# Patient Record
Sex: FEMALE | Race: WHITE | NOT HISPANIC OR LATINO | Employment: OTHER | URBAN - METROPOLITAN AREA
[De-identification: names, ages, dates, MRNs, and addresses within clinical notes are randomized per-mention and may not be internally consistent; named-entity substitution may affect disease eponyms.]

---

## 2017-01-11 DIAGNOSIS — F41.9 ANXIETY: ICD-10-CM

## 2017-01-11 DIAGNOSIS — E78.5 DYSLIPIDEMIA: ICD-10-CM

## 2017-01-11 DIAGNOSIS — R53.1 GENERALIZED WEAKNESS: ICD-10-CM

## 2017-01-11 DIAGNOSIS — R31.29 HEMATURIA, MICROSCOPIC: ICD-10-CM

## 2017-01-11 DIAGNOSIS — E55.9 VITAMIN D DEFICIENCY: ICD-10-CM

## 2017-01-11 DIAGNOSIS — R79.89 ELEVATED TSH: ICD-10-CM

## 2017-01-11 DIAGNOSIS — E86.0 DEHYDRATION: ICD-10-CM

## 2017-01-11 DIAGNOSIS — I10 BENIGN ESSENTIAL HYPERTENSION: Primary | ICD-10-CM

## 2017-01-11 DIAGNOSIS — R73.9 HYPERGLYCEMIA: ICD-10-CM

## 2017-01-12 ENCOUNTER — OFFICE VISIT (OUTPATIENT)
Dept: INTERNAL MEDICINE | Facility: CLINIC | Age: 72
End: 2017-01-12

## 2017-01-12 VITALS
BODY MASS INDEX: 25.52 KG/M2 | HEIGHT: 60 IN | WEIGHT: 130 LBS | DIASTOLIC BLOOD PRESSURE: 72 MMHG | HEART RATE: 80 BPM | TEMPERATURE: 97.6 F | OXYGEN SATURATION: 92 % | SYSTOLIC BLOOD PRESSURE: 130 MMHG

## 2017-01-12 DIAGNOSIS — E55.9 VITAMIN D DEFICIENCY: ICD-10-CM

## 2017-01-12 DIAGNOSIS — F32.89 OTHER DEPRESSION: ICD-10-CM

## 2017-01-12 DIAGNOSIS — E86.0 DEHYDRATION: ICD-10-CM

## 2017-01-12 DIAGNOSIS — I10 BENIGN ESSENTIAL HYPERTENSION: Primary | ICD-10-CM

## 2017-01-12 DIAGNOSIS — K58.8 OTHER IRRITABLE BOWEL SYNDROME: ICD-10-CM

## 2017-01-12 DIAGNOSIS — R73.9 HYPERGLYCEMIA: ICD-10-CM

## 2017-01-12 DIAGNOSIS — F41.9 ANXIETY: ICD-10-CM

## 2017-01-12 DIAGNOSIS — Z09 HOSPITAL DISCHARGE FOLLOW-UP: ICD-10-CM

## 2017-01-12 PROBLEM — K58.9 IBS (IRRITABLE BOWEL SYNDROME): Status: ACTIVE | Noted: 2017-01-12

## 2017-01-12 LAB
25(OH)D3+25(OH)D2 SERPL-MCNC: 15.4 NG/ML (ref 30–100)
ALBUMIN SERPL-MCNC: 4.3 G/DL (ref 3.5–5.2)
ALBUMIN/GLOB SERPL: 1.7 G/DL
ALP SERPL-CCNC: 66 U/L (ref 39–117)
ALT SERPL-CCNC: 16 U/L (ref 1–33)
APPEARANCE UR: CLEAR
AST SERPL-CCNC: 17 U/L (ref 1–32)
BACTERIA #/AREA URNS HPF: ABNORMAL /HPF
BASOPHILS # BLD AUTO: 0.01 10*3/MM3 (ref 0–0.2)
BASOPHILS NFR BLD AUTO: 0.3 % (ref 0–1.5)
BILIRUB SERPL-MCNC: 0.3 MG/DL (ref 0.1–1.2)
BILIRUB UR QL STRIP: (no result)
BUN SERPL-MCNC: 25 MG/DL (ref 8–23)
BUN/CREAT SERPL: 31.3 (ref 7–25)
CALCIUM SERPL-MCNC: 10.2 MG/DL (ref 8.6–10.5)
CASTS URNS MICRO: ABNORMAL
CHLORIDE SERPL-SCNC: 106 MMOL/L (ref 98–107)
CHOLEST SERPL-MCNC: 206 MG/DL (ref 0–200)
CO2 SERPL-SCNC: 26.7 MMOL/L (ref 22–29)
COLOR UR: YELLOW
CREAT SERPL-MCNC: 0.8 MG/DL (ref 0.57–1)
DIFFERENTIAL COMMENT: NORMAL
EOSINOPHIL # BLD AUTO: 0.05 10*3/MM3 (ref 0–0.7)
EOSINOPHIL NFR BLD AUTO: 1.4 % (ref 0.3–6.2)
EPI CELLS #/AREA URNS HPF: ABNORMAL /HPF
ERYTHROCYTE [DISTWIDTH] IN BLOOD BY AUTOMATED COUNT: 14.4 % (ref 11.7–13)
GLOBULIN SER CALC-MCNC: 2.6 GM/DL
GLUCOSE SERPL-MCNC: 122 MG/DL (ref 65–99)
GLUCOSE UR QL: (no result)
HBA1C MFR BLD: 5 % (ref 4.8–5.6)
HCT VFR BLD AUTO: 31.7 % (ref 35.6–45.5)
HDLC SERPL-MCNC: 70 MG/DL (ref 40–60)
HGB BLD-MCNC: 10.4 G/DL (ref 11.9–15.5)
HGB UR QL STRIP: (no result)
IMM GRANULOCYTES # BLD: 0 10*3/MM3 (ref 0–0.03)
IMM GRANULOCYTES NFR BLD: 0 % (ref 0–0.5)
KETONES UR QL STRIP: (no result)
LDLC SERPL CALC-MCNC: 117 MG/DL (ref 0–100)
LEUKOCYTE ESTERASE UR QL STRIP: (no result)
LYMPHOCYTES # BLD AUTO: 1.15 10*3/MM3 (ref 0.9–4.8)
LYMPHOCYTES NFR BLD AUTO: 32.4 % (ref 19.6–45.3)
MCH RBC QN AUTO: 40.2 PG (ref 26.9–32)
MCHC RBC AUTO-ENTMCNC: 32.8 G/DL (ref 32.4–36.3)
MCV RBC AUTO: 122.4 FL (ref 80.5–98.2)
MONOCYTES # BLD AUTO: 0.17 10*3/MM3 (ref 0.2–1.2)
MONOCYTES NFR BLD AUTO: 4.8 % (ref 5–12)
NEUTROPHILS # BLD AUTO: 2.17 10*3/MM3 (ref 1.9–8.1)
NEUTROPHILS NFR BLD AUTO: 61.1 % (ref 42.7–76)
NITRITE UR QL STRIP: (no result)
PH UR STRIP: 5.5 [PH] (ref 5–8)
PLATELET # BLD AUTO: 104 10*3/MM3 (ref 140–500)
PLATELET BLD QL SMEAR: NORMAL
POTASSIUM SERPL-SCNC: 5.1 MMOL/L (ref 3.5–5.2)
PROT SERPL-MCNC: 6.9 G/DL (ref 6–8.5)
PROT UR QL STRIP: (no result)
RBC # BLD AUTO: 2.59 10*6/MM3 (ref 3.9–5.2)
RBC #/AREA URNS HPF: ABNORMAL /HPF
RBC MORPH BLD: NORMAL
SODIUM SERPL-SCNC: 145 MMOL/L (ref 136–145)
SP GR UR: 1.02 (ref 1–1.03)
T4 FREE SERPL-MCNC: 1.05 NG/DL (ref 0.93–1.7)
TRIGL SERPL-MCNC: 95 MG/DL (ref 0–150)
TSH SERPL DL<=0.005 MIU/L-ACNC: 1.89 MIU/ML (ref 0.27–4.2)
UROBILINOGEN UR STRIP-MCNC: (no result) MG/DL
VLDLC SERPL CALC-MCNC: 19 MG/DL (ref 5–40)
WBC # BLD AUTO: 3.55 10*3/MM3 (ref 4.5–10.7)
WBC #/AREA URNS HPF: ABNORMAL /HPF

## 2017-01-12 PROCEDURE — 99214 OFFICE O/P EST MOD 30 MIN: CPT | Performed by: INTERNAL MEDICINE

## 2017-01-12 RX ORDER — DICYCLOMINE HYDROCHLORIDE 10 MG/1
10 CAPSULE ORAL
Qty: 120 CAPSULE | Refills: 1 | Status: SHIPPED | OUTPATIENT
Start: 2017-01-12 | End: 2017-03-22

## 2017-01-19 DIAGNOSIS — R79.89 LOW VITAMIN D LEVEL: Primary | ICD-10-CM

## 2017-01-21 NOTE — PROGRESS NOTES
Subjective   Kyra Pratt is a 71 y.o. female.   She is here today for six-month follow-up for hypertension irritable bowel syndrome dehydration hospital discharge follow-up anxiety vitamin D deficiency hyper glycemia and depression  History of Present Illness   She is here today for six-month follow-up for hypertension irritable bowel syndrome and she is here for hospital discharge follow-up for dehydration as well along with anxiety vitamin D deficiency hyper glycemia and depression and she has no new complaints today except needs a refill on her dicyclomine  The following portions of the patient's history were reviewed and updated as appropriate: allergies, current medications, past family history, past medical history, past social history, past surgical history and problem list.    Review of Systems   All other systems reviewed and are negative.      Objective   Physical Exam   Constitutional: She is oriented to person, place, and time. She appears well-developed and well-nourished. She is cooperative.   HENT:   Head: Normocephalic and atraumatic.   Right Ear: Tympanic membrane and external ear normal.   Left Ear: Tympanic membrane and external ear normal.   Nose: Nose normal.   Mouth/Throat: Oropharynx is clear and moist.   Eyes: Conjunctivae, EOM and lids are normal. Pupils are equal, round, and reactive to light.   Neck: Phonation normal. Neck supple. Carotid bruit is not present.   Cardiovascular: Normal rate, regular rhythm and normal heart sounds.  Exam reveals no gallop and no friction rub.    No murmur heard.  Pulmonary/Chest: Effort normal and breath sounds normal. No respiratory distress.   Abdominal: Soft. Bowel sounds are normal. She exhibits no distension and no mass. There is no hepatosplenomegaly. There is no tenderness. There is no rebound and no guarding. No hernia.   Musculoskeletal: She exhibits no edema.   Neurological: She is alert and oriented to person, place, and time. Coordination and  gait normal.   Skin: Skin is warm and dry.   Psychiatric: She has a normal mood and affect. Her speech is normal and behavior is normal. Judgment and thought content normal.   Nursing note and vitals reviewed.      Assessment/Plan   Diagnoses and all orders for this visit:    Benign essential hypertension    Other irritable bowel syndrome    Dehydration    Hospital discharge follow-up    Anxiety    Vitamin D deficiency    Hyperglycemia    Other depression    Other orders  -     dicyclomine (BENTYL) 10 MG capsule; Take 1 capsule by mouth 4 (Four) Times a Day Before Meals & at Bedtime.      Hypertension well-controlled on current medication  Irritable bowel syndrome continue dicyclomine  Dehydration better now after hospital follow-up  Anxiety supportive meds for this  Vitamin D deficiency supplementation as needed  Hyperglycemia follow hemoglobin A1 C as needed  Depression stable on current medication

## 2017-01-23 ENCOUNTER — APPOINTMENT (OUTPATIENT)
Dept: WOMENS IMAGING | Facility: HOSPITAL | Age: 72
End: 2017-01-23

## 2017-01-23 PROCEDURE — 77063 BREAST TOMOSYNTHESIS BI: CPT | Performed by: RADIOLOGY

## 2017-01-23 PROCEDURE — G0202 SCR MAMMO BI INCL CAD: HCPCS | Performed by: RADIOLOGY

## 2017-01-23 PROCEDURE — MDREVIEWSP: Performed by: RADIOLOGY

## 2017-03-22 ENCOUNTER — OFFICE VISIT (OUTPATIENT)
Dept: GASTROENTEROLOGY | Facility: CLINIC | Age: 72
End: 2017-03-22

## 2017-03-22 VITALS
HEIGHT: 60 IN | WEIGHT: 136.8 LBS | SYSTOLIC BLOOD PRESSURE: 130 MMHG | DIASTOLIC BLOOD PRESSURE: 80 MMHG | BODY MASS INDEX: 26.86 KG/M2

## 2017-03-22 DIAGNOSIS — R19.7 DIARRHEA, UNSPECIFIED TYPE: Primary | ICD-10-CM

## 2017-03-22 DIAGNOSIS — C76.2 ABDOMINAL CARCINOMATOSIS (HCC): ICD-10-CM

## 2017-03-22 DIAGNOSIS — R10.30 LOWER ABDOMINAL PAIN: ICD-10-CM

## 2017-03-22 PROCEDURE — 99203 OFFICE O/P NEW LOW 30 MIN: CPT | Performed by: INTERNAL MEDICINE

## 2017-03-22 RX ORDER — FOLIC ACID 1 MG/1
TABLET ORAL
Refills: 3 | COMMUNITY
Start: 2017-02-10

## 2017-03-22 RX ORDER — HYOSCYAMINE SULFATE 0.125 MG
0.12 TABLET ORAL
Qty: 90 TABLET | Refills: 3 | Status: SHIPPED | OUTPATIENT
Start: 2017-03-22

## 2017-03-22 RX ORDER — APIXABAN 5 MG/1
TABLET, FILM COATED ORAL
Refills: 0 | COMMUNITY
Start: 2017-03-17

## 2017-03-22 NOTE — PROGRESS NOTES
Chief Complaint   Patient presents with   • Abdominal Pain     currently having chemo   • Pre-op Exam     may need a colonoscopy     Kyra Pratt is a 71 y.o. female who presents with crampy abdominal pain.  She has a history of endometrial carcinoma. She has regular BM and then looser stools - hard to get clean.  No blood in stool.  No prior colonoscopy.  No fh crc/polyps. She has tried dicyclomine 10 mg - she takes 2 four times daily without improvement.  No blood in stool. She had recent CT with carcinomatosis with left hydronephrosis - she has plans to start a new chemo regimen next weeks x 4 months.  Colon was noted to be normal with the exception of diverticulosis on CT.  No n/v.    HPI  Past Medical History:   Diagnosis Date   • Benign essential hypertension    • Cancer     uterine   • H/O dilation and curettage    • Vitamin D deficiency      Past Surgical History:   Procedure Laterality Date   • DILATATION AND CURETTAGE     • HYSTERECTOMY         Current Outpatient Prescriptions:   •  ELIQUIS 5 MG tablet tablet, TAKE 2 TABLETS BY MOUTH TWICE A DAY FOR 7 DAYS, THEN 1 TABLET 2 TIMES DAILY, Disp: , Rfl: 0  •  folic acid (FOLVITE) 1 MG tablet, TAKE 1 TABLET EVERY DAY, Disp: , Rfl: 3  •  sertraline (ZOLOFT) 50 MG tablet, Take 50 mg by mouth., Disp: , Rfl:   •  valsartan (DIOVAN) 160 MG tablet, TAKE 1 TABLET BY MOUTH DAILY., Disp: 90 tablet, Rfl: 1  •  hyoscyamine (ANASPAZ,LEVSIN) 0.125 MG tablet, Take 1 tablet by mouth 4 (Four) Times a Day With Meals & at Bedtime., Disp: 90 tablet, Rfl: 3  Allergies   Allergen Reactions   • Ace Inhibitors Cough and Other (See Comments)     Cough   • Penicillins Hives     PCN causes hives  **tolerated Keflex well recently**     Social History     Social History   • Marital status:      Spouse name: N/A   • Number of children: N/A   • Years of education: N/A     Occupational History   • Not on file.     Social History Main Topics   • Smoking status: Never Smoker   •  Smokeless tobacco: Never Used   • Alcohol use 4.2 oz/week     7 Glasses of wine per week      Comment: social   • Drug use: Not on file   • Sexual activity: Not on file     Other Topics Concern   • Not on file     Social History Narrative     Family History   Problem Relation Age of Onset   • Hypertension Mother    • Heart disease Father      Review of Systems-  Vitals:    03/22/17 1347   BP: 130/80     Last Weight    03/22/17  1347   Weight: 136 lb 12.8 oz (62.1 kg)     Physical Exam   Constitutional: She appears well-developed and well-nourished.   HENT:   Head: Normocephalic and atraumatic.   Eyes: No scleral icterus.   Abdominal: Soft. She exhibits no distension and no mass. There is no tenderness.   Nodule palpated to the left of umbilicus   Neurological: She is alert.   Skin: Skin is warm and dry.   Psychiatric: She has a normal mood and affect.     No images are attached to the encounter.  No notes on file  Kyra was seen today for abdominal pain and pre-op exam.    Diagnoses and all orders for this visit:    Diarrhea, unspecified type  -     Celiac Comprehensive Panel    Lower abdominal pain    Abdominal carcinomatosis    Other orders  -     hyoscyamine (ANASPAZ,LEVSIN) 0.125 MG tablet; Take 1 tablet by mouth 4 (Four) Times a Day With Meals & at Bedtime.    Plan:  - trial probiotic and IBgard  - switch to alternative antispasmodic  - check celiac panel   - I do wonder whether the carcinomatosis is contributing to her discomfort  - I have asked her to call me with a progress report after she has tried these measures for a few weeks

## 2017-03-23 ENCOUNTER — TELEPHONE (OUTPATIENT)
Dept: GASTROENTEROLOGY | Facility: CLINIC | Age: 72
End: 2017-03-23

## 2017-03-23 NOTE — TELEPHONE ENCOUNTER
Call from pt. She reports that Hyoscyamine too expensive - requesting alternative.  Advise pt will send request to Dr Phillips.  Pt verb understanding.

## 2017-03-24 LAB
ENDOMYSIUM IGA SER QL: NEGATIVE
GLIADIN PEPTIDE IGA SER-ACNC: 5 UNITS (ref 0–19)
GLIADIN PEPTIDE IGG SER-ACNC: 3 UNITS (ref 0–19)
IGA SERPL-MCNC: 194 MG/DL (ref 64–422)
TTG IGA SER-ACNC: <2 U/ML (ref 0–3)
TTG IGG SER-ACNC: 3 U/ML (ref 0–5)

## 2017-03-24 RX ORDER — CHLORDIAZEPOXIDE HYDROCHLORIDE AND CLIDINIUM BROMIDE 5; 2.5 MG/1; MG/1
1 CAPSULE ORAL
Qty: 90 CAPSULE | Refills: 3 | Status: SHIPPED | OUTPATIENT
Start: 2017-03-24

## 2017-03-24 NOTE — TELEPHONE ENCOUNTER
Call to pt.  Advise per DR Phillips that librax sent to pharmacy.  It is noted that escribing status shows as print.  Call to [facility] @ [phone] and spoke with Pharmacist, Lebron.  Per Dr Phillips order of 3/24/17 @ 1510: Librax 5-2.5 mg 1 cap po three times daily with meals, #90, R3.  Lebron R&V.

## 2017-03-24 NOTE — TELEPHONE ENCOUNTER
Pt stopped by office.  Checking for answer re: Hyoscyamine alternative.  Advise pt that waiting on answer from Dr Phillips, and also instruct to contact insurance to find out what would be covered.  Spouse states this is too difficulty - requesting less expensive from Dr Phillips.    Message to Dr Phillips.

## 2017-04-05 ENCOUNTER — TELEPHONE (OUTPATIENT)
Dept: GASTROENTEROLOGY | Facility: CLINIC | Age: 72
End: 2017-04-05

## 2017-04-05 NOTE — TELEPHONE ENCOUNTER
I have never seen this lady but have reviewed Dr. Phillips's office visit from March 22.  He has endometrial cancer that is likely contributing some to her abdominal discomfort.  She was apparently complaining of abdominal cramping at the time of her office visit and started on Librax.  She can continue the Librax or Tylenol as that seems to offer some relief.  Unfortunately, I have no idea what her chemotherapy regimen is and have no idea if her current medication could be contributing to her discomfort.  This is something she should discuss as possible side effects with her oncologist.  She has never had a screening colonoscopy and I think this was something Dr. Phillips had discussed with her.  If she continues with pain with the Librax and Tylenol becomes ineffective we will need to discuss with Dr. Phillips upon her return need for colonoscopy for further evaluation if it is not deemed a side effect from her new oncology chemotherapy regimen

## 2017-04-05 NOTE — TELEPHONE ENCOUNTER
----- Message from Suzanne Lang sent at 4/5/2017  8:48 AM EDT -----  Regarding: stomach pain   Contact: [phone]  Pt called complain really bad stomach cramps..

## 2017-04-05 NOTE — TELEPHONE ENCOUNTER
----- Message from Roseann Phillips MD sent at 3/24/2017  4:48 PM EDT -----  Celiac panell was normal

## 2017-04-05 NOTE — TELEPHONE ENCOUNTER
Pt called and advised per  JOSELUIS Maldonado Np that she can continue librax or tylenol for the abd cramping.  Also advised she can discuss her discomfort with her oncologist.  Pt verb understanding and reports she has not tried the librax yet.  She states she will try this. Advised to call back with any problems.

## 2017-04-05 NOTE — TELEPHONE ENCOUNTER
"Call to pt.  She reports is on a new kind of chemo - had dose last Thursday.  Noting is having \"terrible cramping across lower abd\".  States comfortable when supine, but worsens when stands or sneezes.  Taking Hyosycamine without noticeable improvement.   States Tylenol completely alleviates cramping.       Notes also has nausea and poor appetite, but does not think nausea enough to use antinauseal prescribed by Onc.  Normal BM yesterday after using suppository.  Pt asking what she can do to manage lower abd cramping and nausea.  Question to pt if she has addressed these issues with Onc.  States \"He referred me to Dr Phillips, so that's why I'm calling you.\"    Advise pt that Dr Phillips OOT and will send message to APRN's.  In the meantime, use Tylenol per dosing instructions to manage abd cramping.  Pt verb understanding.    "

## 2017-04-10 ENCOUNTER — TELEPHONE (OUTPATIENT)
Dept: GASTROENTEROLOGY | Facility: CLINIC | Age: 72
End: 2017-04-10

## 2017-04-10 NOTE — TELEPHONE ENCOUNTER
Called pt and advised per Dr Phillips that it is ok to take senna with clinidium.  Pt verb understanding.

## 2017-04-10 NOTE — TELEPHONE ENCOUNTER
----- Message from Meche Dill sent at 4/10/2017 11:50 AM EDT -----  Regarding: PT CALLED - MED QUESTION  Contact: [phone]  PT CARDIO  ADVISED HER TO TAKE SENNA. IS IT OK TO TAKE SENNA WITH CLIDINUM?

## 2017-07-07 RX ORDER — VALSARTAN 160 MG/1
TABLET ORAL
Qty: 90 TABLET | Refills: 1 | Status: SHIPPED | OUTPATIENT
Start: 2017-07-07